# Patient Record
Sex: FEMALE | Race: WHITE | URBAN - METROPOLITAN AREA
[De-identification: names, ages, dates, MRNs, and addresses within clinical notes are randomized per-mention and may not be internally consistent; named-entity substitution may affect disease eponyms.]

---

## 2017-04-04 ENCOUNTER — OFFICE VISIT (OUTPATIENT)
Dept: OBGYN | Facility: CLINIC | Age: 42
End: 2017-04-04
Payer: COMMERCIAL

## 2017-04-04 VITALS
TEMPERATURE: 99.7 F | WEIGHT: 145 LBS | BODY MASS INDEX: 25.69 KG/M2 | DIASTOLIC BLOOD PRESSURE: 78 MMHG | HEIGHT: 63 IN | SYSTOLIC BLOOD PRESSURE: 102 MMHG

## 2017-04-04 DIAGNOSIS — Z30.41 ENCOUNTER FOR SURVEILLANCE OF CONTRACEPTIVE PILLS: ICD-10-CM

## 2017-04-04 DIAGNOSIS — D22.9 BENIGN NEVUS: ICD-10-CM

## 2017-04-04 DIAGNOSIS — R13.10 DYSPHAGIA, UNSPECIFIED TYPE: ICD-10-CM

## 2017-04-04 DIAGNOSIS — Z13.220 LIPID SCREENING: ICD-10-CM

## 2017-04-04 DIAGNOSIS — Z01.411 ENCOUNTER FOR GYNECOLOGICAL EXAMINATION WITH ABNORMAL FINDING: Primary | ICD-10-CM

## 2017-04-04 DIAGNOSIS — Z12.31 ENCOUNTER FOR SCREENING MAMMOGRAM FOR BREAST CANCER: ICD-10-CM

## 2017-04-04 DIAGNOSIS — Z12.4 SCREENING FOR MALIGNANT NEOPLASM OF CERVIX: ICD-10-CM

## 2017-04-04 PROCEDURE — 99386 PREV VISIT NEW AGE 40-64: CPT | Performed by: OBSTETRICS & GYNECOLOGY

## 2017-04-04 PROCEDURE — 87624 HPV HI-RISK TYP POOLED RSLT: CPT | Performed by: OBSTETRICS & GYNECOLOGY

## 2017-04-04 PROCEDURE — G0145 SCR C/V CYTO,THINLAYER,RESCR: HCPCS | Performed by: OBSTETRICS & GYNECOLOGY

## 2017-04-04 NOTE — PROGRESS NOTES
HPI:  Khloe Burk is a 42 year old white female , c/s due to arrest of labor CPD, oral contraceptives for contraception (but she notes that she does not use this consistently, Trinessa, and using for acne) who presents for an annual exam and pap.  Instrucitons and indications for backup were thoroughly rev.  All her ?'s were answered.    She is new to this clinic (previously seen at [facility]), and was recommended by a friend Soni Lugor, to come in. She is doing well over all.     Difficulty swallowing: she notes that she has some difficulty getting food down, and she will have to vomit to get the food out. She is not sure if this is GERD related. This has been happening for the past few months or so.     Self breast exam,  ACS screening mammogram recs, the use of 81 mg ASA to decrease the risk of heart disease, lipid screening, colon cancer screening recs and Dexa scan recs thoroughly reviewed.      History reviewed. No pertinent past medical history.  History reviewed. No pertinent surgical history.  Family History   Problem Relation Age of Onset     DIABETES Mother      Hypertension Mother      Depression Sister      Anxiety Disorder Sister      Social History     Social History     Marital status:      Spouse name: N/A     Number of children: N/A     Years of education: N/A     Occupational History     Not on file.     Social History Main Topics     Smoking status: Never Smoker     Smokeless tobacco: Not on file     Alcohol use Yes      Comment: 3-4 drinks per week.     Drug use: No     Sexual activity: Yes     Partners: Male     Other Topics Concern     Not on file     Social History Narrative     No narrative on file       Allergies:  Review of patient's allergies indicates no known allergies.    No current outpatient prescriptions on file.       ROS:  C: NEGATIVE for fever, chills, change in weight  I: NEGATIVE for worrisome rashes, moles or lesions. Derm mole check  "recommended  E: NEGATIVE for vision changes or irritation  E/M: NEGATIVE for ear, mouth and throat problems POSITIVE for dysphagia  R: NEGATIVE for significant cough or SOB  B: NEGATIVE for masses, tenderness or discharge  CV: NEGATIVE for chest pain, palpitations or peripheral edema  GI: NEGATIVE for nausea, abdominal pain, heartburn, or change in bowel habits   female: Regular cycles  : NEGATIVE for frequency, dysuria, or hematuria  M: NEGATIVE for significant arthralgias or myalgia  N: NEGATIVE for weakness, dizziness or paresthesias  E: NEGATIVE for temperature intolerance, skin/hair changes  H: NEGATIVE for bleeding problems  P: NEGATIVE for changes in mood or affect    This document serves as a record of the services and decisions personally performed and made by Luis Yung M.D. It was created on his behalf by Asha Carrasco, a trained medical scribe. The creation of this document is based the provider's statements to the medical scribe.  Asha Carrasco April 4, 2017 3:47 PM    EXAM:  Vitals: /78 (BP Location: Right arm, Patient Position: Chair, Cuff Size: Adult Regular)  Temp 99.7  F (37.6  C) (Oral)  Ht 5' 3\" (1.6 m)  Wt 145 lb (65.8 kg)  LMP 03/19/2017 (Exact Date)  Breastfeeding? No  BMI 25.69 kg/m2  BMI= Body mass index is 25.69 kg/(m^2).  Constitutional: healthy, alert and no distress  Head: Normocephalic. No masses, lesions, tenderness or abnormalities, no sinus tenderness  Neck: Neck supple. No adenopathy. Thyroid symmetric, normal size  Breast:  breasts symmetric, no dominant or suspicious mass, no skin or nipple changes or no axillary adenopathy  Cardiovascular: PMI normal. No lifts, heaves, or thrills. RRR. No murmurs, clicks gallops or rub  Respiratory: Percussion normal. Good diaphragmatic excursion. Lungs clear  Gastrointestinal: Abdomen soft, non-tender. BS normal. No masses, organomegaly  Genitourinary: Pelvic Exam:  External Genitalia:     Normal appearance for age, no " discharge present, no tenderness present, no inflammatory lesions present, color normal  Vagina:     Normal vaginal vault without central or paravaginal defects, no discharge present, no inflammatory lesions present, no masses present  Bladder:     Nontender to palpation  Urethra:   Urethral Body:  Urethra palpation normal, urethra structural support normal   Urethral Meatus:  No erythema or lesions present  Cervix:     Appearance healthy, no lesions present, nontender to palpation, no bleeding present  Uterus:     Nontender to palpation, no masses present, position anteflexed, mobility: normal  Adnexa:     No adnexal tenderness present, no adnexal masses present  Perineum:     Perineum within normal limits, no evidence of trauma, no rashes or skin lesions present  Anus:     Anus within normal limits, no hemorrhoids present  Inguinal Lymph Nodes:     No lymphadenopathy present  Pubic Hair:     Normal pubic hair distribution for age  Genitalia and Groin:     No rashes present, no lesions present, no areas of discoloration, no masses present  Musculoskeletalextremities normal- no gross deformities noted, gait normal and normal muscle tone, no CVA tenderness  Integument: no suspicious lesions or rashes  Neurologic: Gait normal. Sensation grossly WNL. cranial nerves 2-12 intact   Psychiatric: mentation appears normal and affect normal/bright       Assessment/Plan  (Z01.411) Encounter for gynecological examination with abnormal finding  (primary encounter diagnosis)  Comment: normal exam today, updating lipids, mammo, and pap today  Plan: HIV Antigen Antibody Combo, Anti Treponema,         Chlamydia trachomatis PCR, Neisseria         gonorrhoeae PCR  Follow up in 1 year.     (R13.10) Dysphagia, unspecified type  Comment: unknown etiology, I would like her to see a specialist.   Plan: GASTROENTEROLOGY ADULT REF CONSULT ONLY          (Z30.41) Encounter for surveillance of contraceptive pills  Comment: 3 months provided  today, patient to call if she would like to continue  Plan: norgestim-eth estrad triphasic (ORTHO         TRI-CYCLEN LO) 0.18/0.215/0.25 MG-25 MCG per         tablet          (Z13.220) Lipid screening  Comment: non fasting today, future lab ordered  Plan: Lipid Profile with reflex to direct LDL          (Z12.4) Screening for malignant neoplasm of cervix  Comment: due  Plan: Pap imaged thin layer screen with HPV -         recommended age 30 - 65 years (select HPV order        below), HPV High Risk Types DNA Cervical          (Z12.31) Encounter for screening mammogram for breast cancer  Comment: first mammo  Plan: MA Screen Bilateral w/Naresh    (D22.9) Benign nevus  Comment: for full skin exam  Plan: DERMATOLOGY REFERRAL                     The information in this document, created by the medical scribe for me, accurately reflects the services I personally performed and the decisions made by me. I have reviewed and approved this document for accuracy prior to leaving the patient care area.  4/4/2017 3:47 PM         Luis Yung M.D.

## 2017-04-04 NOTE — NURSING NOTE
"Chief Complaint   Patient presents with     Gyn Exam     Discuss reflux and bump on right thigh for while.       Initial /78 (BP Location: Right arm, Patient Position: Chair, Cuff Size: Adult Regular)  Temp 99.7  F (37.6  C) (Oral)  Ht 5' 3\" (1.6 m)  Wt 145 lb (65.8 kg)  LMP 2017 (Exact Date)  Breastfeeding? No  BMI 25.69 kg/m2 Estimated body mass index is 25.69 kg/(m^2) as calculated from the following:    Height as of this encounter: 5' 3\" (1.6 m).    Weight as of this encounter: 145 lb (65.8 kg).  BP completed using cuff size: regular        The following HM Due: pap smear      The following patient reported/Care Every where data was sent to:  P ABSTRACT QUALITY INITIATIVES [[id number]]  Unable to locate prev pap resutls     patient has appointment for today             "

## 2017-04-04 NOTE — LETTER
[place] [address]  [facility] [address]  [phone]        April 9, 2018    Khloe Burk  [address]  [facility] [place] [address]              Dear Khloe Burk    This is to remind you that your fasting lab is due.    You may call our office at [phone] to schedule an appointment.    Please disregard this notice if you have already had your labs drawn or made an appointment.        Sincerely,        Luis Yung MD

## 2017-04-04 NOTE — MR AVS SNAPSHOT
After Visit Summary   4/4/2017    Khloe Burk    MRN: [id number]           Patient Information     Date Of Birth          1975        Visit Information        Provider Department      4/4/2017 3:30 PM Luis Yung MD [facility]        Today's Diagnoses     Encounter for gynecological examination with abnormal finding    -  1    Dysphagia, unspecified type        Encounter for surveillance of contraceptive pills        Lipid screening        Screening for malignant neoplasm of cervix        Encounter for screening mammogram for breast cancer        Benign nevus          Care Instructions    You can reach your [place] Care Team any time of the day by calling [phone]. This number will put you in touch with the 24 hour nurse line if the clinic is closed.    To contact your OB/GYN Station Coordinator/Surgery Scheduler please call [phone]. This is a direct number for your care team between 8 a.m. and 4 p.m. Monday through Friday.    [facility] is open for your convenience:  Monday through Friday 8 a.m. to 6 p.m.  Closed weekends and all major holidays.         Follow-ups after your visit        Additional Services     DERMATOLOGY REFERRAL       Your provider has referred you to:     [facility]: [facility] [phone]   [email or web address]  [facility]: [facility] [place] [phone]   [email or web address]    Please be aware that coverage of these services is subject to the terms and limitations of your health insurance plan.  Call member services at your health plan with any benefit or coverage questions.      Please bring the following with you to your appointment:    (1) Any X-Rays, CTs or MRIs which have been performed.  Contact the facility where they were done to arrange for  prior to your scheduled appointment.    (2) List of current  medications  (3) This referral request   (4) Any documents/labs given to you for this referral            GASTROENTEROLOGY ADULT REF CONSULT ONLY       Preferred Location: [facility] [phone]      Please be aware that coverage of these services is subject to the terms and limitations of your health insurance plan.  Call member services at your health plan with any benefit or coverage questions.  Any procedures must be performed at a [place] facility OR coordinated by your clinic's referral office.    Please bring the following with you to your appointment:    (1) Any X-Rays, CTs or MRIs which have been performed.  Contact the facility where they were done to arrange for  prior to your scheduled appointment.    (2) List of current medications   (3) This referral request   (4) Any documents/labs given to you for this referral                  Future tests that were ordered for you today     Open Future Orders        Priority Expected Expires Ordered    Lipid Profile with reflex to direct LDL Routine  4/4/2018 4/4/2017    MA Screen Bilateral w/Naresh Routine  4/4/2018 4/4/2017    HIV Antigen Antibody Combo Routine  4/4/2018 4/4/2017    Anti Treponema Routine  4/4/2018 4/4/2017    Hepatitis B surface antigen Routine  4/4/2018 4/4/2017            Who to contact     If you have questions or need follow up information about today's clinic visit or your schedule please contact [facility] directly at [phone].  Normal or non-critical lab and imaging results will be communicated to you by MyChart, letter or phone within 4 business days after the clinic has received the results. If you do not hear from us within 7 days, please contact the clinic through MyChart or phone. If you have a critical or abnormal lab result, we will notify you by phone as soon as possible.  Submit refill requests through [employer] or call your pharmacy and they will forward the refill request to us. Please allow 3 business  "days for your refill to be completed.          Additional Information About Your Visit        MyChart Information     [email or web address] lets you send messages to your doctor, view your test results, renew your prescriptions, schedule appointments and more. To sign up, go to [email or web address] . Click on \"Log in\" on the left side of the screen, which will take you to the Welcome page. Then click on \"Sign up Now\" on the right side of the page.     You will be asked to enter the access code listed below, as well as some personal information. Please follow the directions to create your username and password.     Your access code is: [email or web address]  Expires: 7/3/2017  4:37 PM     Your access code will  in 90 days. If you need help or a new code, please call your [place] clinic or [phone].        Care EveryWhere ID     This is your Care EveryWhere ID. This could be used by other organizations to access your [place] medical records  [id number]        Your Vitals Were     Temperature Height Last Period Breastfeeding? BMI (Body Mass Index)       99.7  F (37.6  C) (Oral) 5' 3\" (1.6 m) 2017 (Exact Date) No 25.69 kg/m2        Blood Pressure from Last 3 Encounters:   17 102/78    Weight from Last 3 Encounters:   17 145 lb (65.8 kg)              We Performed the Following     Chlamydia trachomatis PCR     DERMATOLOGY REFERRAL     GASTROENTEROLOGY ADULT REF CONSULT ONLY     HPV High Risk Types DNA Cervical     Neisseria gonorrhoeae PCR     Pap imaged thin layer screen with HPV - recommended age 30 - 65 years (select HPV order below)        Primary Care Provider    None Specified       No primary provider on file.        Thank you!     Thank you for choosing [facility]  for your care. Our goal is always to provide you with excellent care. Hearing back from our patients is one way we can continue to improve our services. Please take a few minutes to complete the written survey that you may " receive in the mail after your visit with us. Thank you!             Your Updated Medication List - Protect others around you: Learn how to safely use, store and throw away your medicines at www.disposemymeds.org.      Notice  As of 4/4/2017  4:37 PM    You have not been prescribed any medications.

## 2017-04-04 NOTE — PATIENT INSTRUCTIONS
You can reach your [place] Care Team any time of the day by calling [phone]. This number will put you in touch with the 24 hour nurse line if the clinic is closed.    To contact your OB/GYN Station Coordinator/Surgery Scheduler please call [phone]. This is a direct number for your care team between 8 a.m. and 4 p.m. Monday through Friday.    [facility] is open for your convenience:  Monday through Friday 8 a.m. to 6 p.m.  Closed weekends and all major holidays.

## 2017-04-04 NOTE — LETTER
April 12, 2017      Khloe Adenuer  [address] [address]    Dear ,    This letter is in regards to the PAP smear and HPV (Human Papillomavirus) test you had done recently. Your PAP test result is normal, but your HPV (Human Papillomavirus) test was positive.     About 80 percent of women have been exposed to HPV virus throughout their lifetime. There is no medication for the treatment of HPV. Typically your own immune system gets rid of the virus before it does harm. HPV is spread by direct skin-to-skin contact, including sexual intercourse, oral sex, anal sex, or any other contact involving the genital area (example: hand to genital contact). It is not possible to become infected with HPV by touching an object, such as a toilet seat. Most people who are infected with HPV have no signs or symptoms.    Things that you can do to boost your immune system and help your body get rid of HPV: get plenty of rest, eat a well-balanced diet of healthy foods, and stop smoking.     Please return in 1 year to repeat your pap smear and HPV test.     If you have additional questions regarding this result, please call [phone].    Sincerely,      Luis Yung MD

## 2017-04-06 PROBLEM — R13.10 DYSPHAGIA, UNSPECIFIED TYPE: Status: ACTIVE | Noted: 2017-04-06

## 2017-04-06 PROBLEM — D22.9 BENIGN NEVUS: Status: ACTIVE | Noted: 2017-04-06

## 2017-04-06 RX ORDER — NORGESTIMATE AND ETHINYL ESTRADIOL 7DAYSX3 LO
1 KIT ORAL DAILY
Qty: 84 TABLET | Refills: 0 | Status: SHIPPED | OUTPATIENT
Start: 2017-04-06

## 2017-04-07 LAB
COPATH REPORT: NORMAL
PAP: NORMAL

## 2017-04-11 LAB
FINAL DIAGNOSIS: ABNORMAL
HPV HR 12 DNA CVX QL NAA+PROBE: POSITIVE
HPV16 DNA SPEC QL NAA+PROBE: NEGATIVE
HPV18 DNA SPEC QL NAA+PROBE: NEGATIVE
SPECIMEN DESCRIPTION: ABNORMAL

## 2017-04-12 ENCOUNTER — RESULT FOLLOW UP (OUTPATIENT)
Dept: OBGYN | Facility: CLINIC | Age: 42
End: 2017-04-12

## 2017-04-12 DIAGNOSIS — B97.7 HIGH RISK HPV INFECTION: Primary | ICD-10-CM

## 2017-04-12 NOTE — PROGRESS NOTES
4/4/17 Pap NIL with Pos HR HPV. Plan: cotest in 1 year.   4/2/18 Cotest reminder letter sent (rlm)  11/02/18 LMTC and schedule at [facility].  11/16/18 Patient is lost to pap tracking follow-up. FYI routed to provider.

## 2017-04-12 NOTE — LETTER
April 2, 2018      Khloe Adenuer  [address] [facility] [address]    Dear ,      At [place], your health and wellness is our primary concern. That is why we are following up on a positive high risk HPV test from 4/4/17. Your provider had recommended that you have a Pap smear and HPV test completed by 4/4/18. Our records do not show that this has been scheduled.    It is important to complete the follow up that your provider has suggested for you to ensure that there are no worsening changes which may, over time, develop into cancer.      Please contact our office at  [phone] to schedule an appointment for a Pap smear and HPV test at your earliest convenience. If you have questions or concerns, please call the clinic and we will be happy to assist you.    If you have completed the tests outside of [place], please have the results forwarded to our office. We will update the chart for your primary Physician to review before your next annual physical.     Thank you for choosing [place]!    Sincerely,      Luis Yung MD/richar

## 2018-11-02 ENCOUNTER — TELEPHONE (OUTPATIENT)
Dept: OBGYN | Facility: CLINIC | Age: 43
End: 2018-11-02

## 2018-11-02 NOTE — TELEPHONE ENCOUNTER
Pt is past due for f/u pap smear after previous +HPV.  LMTC and schedule at [facility].  Alix Lopez,    Pap Tracking